# Patient Record
Sex: FEMALE | Race: WHITE | NOT HISPANIC OR LATINO | Employment: OTHER | URBAN - METROPOLITAN AREA
[De-identification: names, ages, dates, MRNs, and addresses within clinical notes are randomized per-mention and may not be internally consistent; named-entity substitution may affect disease eponyms.]

---

## 2017-09-12 ENCOUNTER — TELEPHONE (OUTPATIENT)
Dept: OBSTETRICS AND GYNECOLOGY | Facility: CLINIC | Age: 62
End: 2017-09-12

## 2017-09-12 DIAGNOSIS — Z12.31 ENCOUNTER FOR SCREENING MAMMOGRAM FOR BREAST CANCER: Primary | ICD-10-CM

## 2017-09-12 NOTE — TELEPHONE ENCOUNTER
----- Message from Dawna Leal sent at 9/12/2017 12:33 PM CDT -----  Contact: self  Tianna Bryan  MRN: [id number]  Home Phone      [phone]  Work Phone      Not on file.  Mobile          Not on file.    Patient Care Team:  KARTHIKEYAN Grover III, MD as PCP - General (Family Medicine)  Carlos Levi MD as Obstetrician (Obstetrics)  OB? No  What phone number can you be reached at? [phone]  Message: pt needs mammo order for DOS 09-

## 2017-09-21 ENCOUNTER — OFFICE VISIT (OUTPATIENT)
Dept: OBSTETRICS AND GYNECOLOGY | Facility: CLINIC | Age: 62
End: 2017-09-21
Payer: COMMERCIAL

## 2017-09-21 ENCOUNTER — HOSPITAL ENCOUNTER (OUTPATIENT)
Dept: RADIOLOGY | Facility: HOSPITAL | Age: 62
Discharge: HOME | End: 2017-09-21
Attending: OBSTETRICS & GYNECOLOGY
Payer: COMMERCIAL

## 2017-09-21 VITALS
BODY MASS INDEX: 26.84 KG/M2 | HEIGHT: 66 IN | WEIGHT: 167 LBS | SYSTOLIC BLOOD PRESSURE: 120 MMHG | HEART RATE: 86 BPM | DIASTOLIC BLOOD PRESSURE: 82 MMHG | RESPIRATION RATE: 16 BRPM

## 2017-09-21 VITALS — WEIGHT: 175 LBS | BODY MASS INDEX: 28.12 KG/M2 | HEIGHT: 66 IN

## 2017-09-21 DIAGNOSIS — N95.1 MENOPAUSAL SYMPTOMS: ICD-10-CM

## 2017-09-21 DIAGNOSIS — Z12.31 ENCOUNTER FOR SCREENING MAMMOGRAM FOR BREAST CANCER: ICD-10-CM

## 2017-09-21 DIAGNOSIS — Z01.419 WELL WOMAN EXAM WITH ROUTINE GYNECOLOGICAL EXAM: Primary | ICD-10-CM

## 2017-09-21 PROCEDURE — 77067 SCR MAMMO BI INCL CAD: CPT | Mod: TC

## 2017-09-21 PROCEDURE — 99396 PREV VISIT EST AGE 40-64: CPT | Mod: S$GLB,,, | Performed by: OBSTETRICS & GYNECOLOGY

## 2017-09-21 PROCEDURE — 77067 SCR MAMMO BI INCL CAD: CPT | Mod: 26,,, | Performed by: RADIOLOGY

## 2017-09-21 PROCEDURE — 99999 PR PBB SHADOW E&M-EST. PATIENT-LVL III: CPT | Mod: PBBFAC,,, | Performed by: OBSTETRICS & GYNECOLOGY

## 2017-09-21 PROCEDURE — 77063 BREAST TOMOSYNTHESIS BI: CPT | Mod: 26,,, | Performed by: RADIOLOGY

## 2017-09-21 NOTE — PROGRESS NOTES
Subjective:    Patient ID: Tianna Bryan is a 61 y.o. female.     Chief Complaint: Annual Well Woman Exam     History of Present Illness:  Tianna presents today for Annual Well Woman exam. .Patient's last menstrual period was 1989.. She is currently using no hormone replacement therapy for the past 9 months and she reports problems -   with hot flashes, night sweats, irritability and vaginal dryness. She denies breast tenderness, masses, nipple discharge.  She reports no problems with urination. Bowel movements have not significantly changed. She would prefer not to get back on Estrogen.    Menstrual History:   Patient's last menstrual period was 1989.    OB History      Para Term  AB Living    2 2 2     2    SAB TAB Ectopic Multiple Live Births            2          Review of Systems   Constitutional: Negative for activity change, appetite change, chills, diaphoresis, fatigue, fever and unexpected weight change.   HENT: Negative for mouth sores and tinnitus.    Eyes: Negative for discharge and visual disturbance.   Respiratory: Negative for cough, shortness of breath and wheezing.    Cardiovascular: Negative for chest pain, palpitations and leg swelling.   Gastrointestinal: Positive for abdominal pain and bloating. Negative for blood in stool, constipation, diarrhea, nausea and vomiting.   Endocrine: Positive for hot flashes. Negative for diabetes, hair loss, hyperthyroidism and hypothyroidism.   Genitourinary: Negative for decreased libido, dyspareunia, dysuria, flank pain, frequency, genital sores, hematuria, menorrhagia, menstrual problem, pelvic pain, urgency, vaginal bleeding, vaginal discharge, vaginal pain, urinary incontinence, postcoital bleeding and vaginal odor.   Musculoskeletal: Positive for back pain, joint swelling and myalgias.   Skin:  Negative for rash, no acne and hair changes.   Neurological: Negative for seizures, syncope, numbness and headaches.   Hematological:  Negative for adenopathy. Does not bruise/bleed easily.   Psychiatric/Behavioral: Positive for sleep disturbance. The patient is not nervous/anxious.    Breast: Negative for breast pain and nipple discharge        Objective:    Vital Signs:  Vitals:    09/21/17 1511   BP: 120/82   Pulse: 86   Resp: 16       Physical Exam:  General:  alert,normal appearing gravid female   Skin:  Skin color, texture, turgor normal. No rashes or lesions   HEENT:  conjunctivae/corneas clear. PERRL.   Neck: supple, trachea midline, no adenopathy or thyromegally   Respiratory:  clear to auscultation bilaterally   Heart:  regular rate and rhythm, S1, S2 normal, no murmur, click, rub or gallop   Breasts:   Nipples are protruding and have no nipple discharge. No palpable masses, erythema, skin changes, tenderness, or adenopathy.   Abdomen:  soft, non-tender. Bowel sounds normal. No masses,  no organomegaly   Pelvis: External genitalia: normal general appearance  Urinary system: urethral meatus normal, bladder nontender  Vaginal: normal mucosa without prolapse or lesions. Mild atrophy  Cervix: removed surgically  Uterus: removed surgically  Adnexa: removed surgically   Extremities: Normal ROM; no edema, no cyanosis   Neurologial: Normal strength and tone. No focal numbness or weakness. Reflexes 2+ and equal.   Psychiatric: normal mood, speech, dress, and thought processes         Assessment:      1. Well woman exam with routine gynecological exam    2. Menopausal symptoms          Plan:      Well woman exam with routine gynecological exam    Menopausal symptoms      She will try estroven for menopausal symptoms. If not better, I will consider gabapentin.    COUNSELING:  Tianna was counseled on A.C.O.G. Pap guidelines and recommendations for yearly pelvic exams in addition to recommendations for yearly mammograms and monthly self breast exams. In addition she was counseled on adequate intake of calcium and vitamin D; to see her PCP for other  health maintenance.

## 2017-09-21 NOTE — PATIENT INSTRUCTIONS
Estroven - one tablet nightly for one month     Gabapentin - can add if estroven not effective.    Calcium 1200mg + 1000 Vit D

## 2018-09-24 ENCOUNTER — TELEPHONE (OUTPATIENT)
Dept: OBSTETRICS AND GYNECOLOGY | Facility: CLINIC | Age: 63
End: 2018-09-24

## 2018-09-24 DIAGNOSIS — Z12.31 ENCOUNTER FOR SCREENING MAMMOGRAM FOR BREAST CANCER: Primary | ICD-10-CM

## 2018-09-24 NOTE — TELEPHONE ENCOUNTER
----- Message from Pretty Jean Baptiste MA sent at 9/24/2018  2:46 PM CDT -----  Contact: Self  Tianna Bryan  MRN: [id number]  Home Phone      [phone]  Work Phone      Not on file.  Mobile          Not on file.    Patient Care Team:  KARTHIKEYAN Grover III, MD as PCP - General (Family Medicine)  Carlos Levi MD as Obstetrician (Obstetrics)  OB? No  What phone number can you be reached at? [phone]  Message: Please place and link mammogram orders to appointment scheduled on 12/3/2018.

## 2018-10-26 ENCOUNTER — TELEPHONE (OUTPATIENT)
Dept: OBSTETRICS AND GYNECOLOGY | Facility: CLINIC | Age: 63
End: 2018-10-26

## 2018-10-26 NOTE — TELEPHONE ENCOUNTER
Patient states that she has been having vaginal burning and irritation x 2 weeks. She states that she tried OTC Monistat without relief. Patient denies discharge, odor, urinary or other symptoms. Appointment scheduled with patient.

## 2018-10-26 NOTE — TELEPHONE ENCOUNTER
----- Message from Svetlana Zavala sent at 10/26/2018  2:35 PM CDT -----  Contact: self  Tianna Bryan  MRN: [id number]  Home Phone  [phone]  Work Phone Not on file.  Mobile  Not on file.    Patient Care Team:  KARTHIKEYAN Grover III, MD as PCP - General (Family Medicine)  Carlos Levi MD as Obstetrician (Obstetrics)  OB? No  What phone number can you be reached at? [phone]  Message: Pt c/o burning to private area. Medication monistat with symptoms persisting for 3 weeks. Pt scheduled for her annual in December. Please return call.

## 2018-10-29 ENCOUNTER — OFFICE VISIT (OUTPATIENT)
Dept: OBSTETRICS AND GYNECOLOGY | Facility: CLINIC | Age: 63
End: 2018-10-29
Payer: COMMERCIAL

## 2018-10-29 ENCOUNTER — LAB VISIT (OUTPATIENT)
Dept: LAB | Facility: HOSPITAL | Age: 63
End: 2018-10-29
Attending: OBSTETRICS & GYNECOLOGY
Payer: COMMERCIAL

## 2018-10-29 VITALS
DIASTOLIC BLOOD PRESSURE: 84 MMHG | WEIGHT: 169.19 LBS | BODY MASS INDEX: 27.19 KG/M2 | RESPIRATION RATE: 18 BRPM | HEART RATE: 70 BPM | SYSTOLIC BLOOD PRESSURE: 120 MMHG | HEIGHT: 66 IN

## 2018-10-29 DIAGNOSIS — R53.83 FATIGUE, UNSPECIFIED TYPE: ICD-10-CM

## 2018-10-29 DIAGNOSIS — N94.9 VAGINAL BURNING: Primary | ICD-10-CM

## 2018-10-29 LAB — TSH SERPL DL<=0.005 MIU/L-ACNC: 1.68 UIU/ML

## 2018-10-29 PROCEDURE — 3008F BODY MASS INDEX DOCD: CPT | Mod: CPTII,S$GLB,, | Performed by: OBSTETRICS & GYNECOLOGY

## 2018-10-29 PROCEDURE — 36415 COLL VENOUS BLD VENIPUNCTURE: CPT

## 2018-10-29 PROCEDURE — 1101F PT FALLS ASSESS-DOCD LE1/YR: CPT | Mod: CPTII,S$GLB,, | Performed by: OBSTETRICS & GYNECOLOGY

## 2018-10-29 PROCEDURE — 99999 PR PBB SHADOW E&M-EST. PATIENT-LVL III: CPT | Mod: PBBFAC,,, | Performed by: OBSTETRICS & GYNECOLOGY

## 2018-10-29 PROCEDURE — 87660 TRICHOMONAS VAGIN DIR PROBE: CPT

## 2018-10-29 PROCEDURE — 99213 OFFICE O/P EST LOW 20 MIN: CPT | Mod: S$GLB,,, | Performed by: OBSTETRICS & GYNECOLOGY

## 2018-10-29 PROCEDURE — 84443 ASSAY THYROID STIM HORMONE: CPT

## 2018-10-29 RX ORDER — ALPRAZOLAM 1 MG/1
TABLET ORAL
COMMUNITY
Start: 2018-10-05 | End: 2023-09-25

## 2018-10-29 NOTE — PROGRESS NOTES
Subjective:    Patient ID: Tianna Bryan is a 63 y.o. y.o. female    Chief Complaint:   Chief Complaint   Patient presents with    Vaginal burning     x 3 weeks; denies discharge or irritation       History of Present Illness:  Tianna presents today for evaluation of vaginal burning that has shikha present for about one month. She tried Monistat and Diflucan but states she has not had any relief. She denies vaginal discharge. She also complains of extreme fatigue. She had a health screen at work and states her cholesterol was a little elevated, but everything else was normal. Thyroid was not evaluated.    Review of Systems   Constitutional: Positive for fatigue. Negative for activity change, appetite change, chills, diaphoresis, fever and unexpected weight change.   HENT: Negative for mouth sores and tinnitus.    Eyes: Negative for discharge and visual disturbance.   Respiratory: Negative for cough, shortness of breath and wheezing.    Cardiovascular: Negative for chest pain, palpitations and leg swelling.   Gastrointestinal: Negative for abdominal pain, blood in stool, constipation, diarrhea, nausea and vomiting.   Endocrine: Negative for diabetes, hair loss, hot flashes, hyperthyroidism and hypothyroidism.   Genitourinary: Positive for vaginal pain. Negative for decreased libido, dyspareunia, dysuria, flank pain, frequency, genital sores, hematuria, menorrhagia, menstrual problem, pelvic pain, urgency, vaginal bleeding, vaginal discharge, urinary incontinence, postcoital bleeding and vaginal odor.   Musculoskeletal: Negative for back pain, joint swelling and myalgias.   Skin:  Negative for rash, no acne and hair changes.   Neurological: Negative for seizures, syncope, numbness and headaches.   Hematological: Negative for adenopathy. Does not bruise/bleed easily.   Psychiatric/Behavioral: Negative for sleep disturbance. The patient is not nervous/anxious.    Breast: Negative for breast pain and nipple  discharge          Objective:    Vital Signs:  Vitals:    10/29/18 1442   BP: 120/84   Pulse: 70   Resp: 18       Physical Exam:  General:  alert,normal appearing gravid female   Skin:  Skin color, texture, turgor normal. No rashes or lesions   Abdomen: soft, non-tender. Bowel sounds normal. No masses,  no organomegaly   Pelvis: External genitalia: normal general appearance  Urinary system: urethral meatus normal, bladder nontender  Vaginal: atrophic mucosa, No discharge.  Cervix: removed surgically  Uterus: removed surgically  Adnexa: non palpable         Assessment:      1. Vaginal burning    2. Fatigue, unspecified type          Plan:      Vaginal burning  -     Vaginosis Screen by DNA Probe    Fatigue, unspecified type  -     TSH; Future; Expected date: 10/29/2018

## 2018-10-30 LAB
CANDIDA RRNA VAG QL PROBE: NEGATIVE
G VAGINALIS RRNA GENITAL QL PROBE: NEGATIVE
T VAGINALIS RRNA GENITAL QL PROBE: NEGATIVE

## 2018-11-01 ENCOUNTER — TELEPHONE (OUTPATIENT)
Dept: OBSTETRICS AND GYNECOLOGY | Facility: CLINIC | Age: 63
End: 2018-11-01

## 2018-11-01 RX ORDER — ESTRADIOL 10 UG/1
1 INSERT VAGINAL
Qty: 8 TABLET | Refills: 11 | Status: SHIPPED | OUTPATIENT
Start: 2018-11-01 | End: 2019-02-11 | Stop reason: SDUPTHER

## 2018-11-01 NOTE — TELEPHONE ENCOUNTER
----- Message from Kianna Lunsford MA sent at 11/1/2018 11:07 AM CDT -----  Contact: SELF  Tianna Bryan  MRN: [id number]  Home Phone      [phone]  Work Phone      Not on file.  Mobile          Not on file.    Patient Care Team:  KARTHIKEYAN Grover III, MD as PCP - General (Family Medicine)  Carlos Levi MD as Obstetrician (Obstetrics)  OB? No  What phone number can you be reached at?  [phone]  Message:   Would like culture results and following up on cream that was to be called in.

## 2018-11-01 NOTE — TELEPHONE ENCOUNTER
Patient requesting culture results, as well as, Cream that was to be sent in on Monday for vaginal irritation. Aware you were out of office until today at 2pm. Please advise.

## 2018-12-03 ENCOUNTER — HOSPITAL ENCOUNTER (OUTPATIENT)
Dept: RADIOLOGY | Facility: HOSPITAL | Age: 63
Discharge: HOME | End: 2018-12-03
Attending: OBSTETRICS & GYNECOLOGY
Payer: COMMERCIAL

## 2018-12-03 VITALS — BODY MASS INDEX: 27.16 KG/M2 | HEIGHT: 66 IN | WEIGHT: 169 LBS

## 2018-12-03 DIAGNOSIS — Z12.31 ENCOUNTER FOR SCREENING MAMMOGRAM FOR BREAST CANCER: ICD-10-CM

## 2018-12-03 PROCEDURE — 77067 SCR MAMMO BI INCL CAD: CPT | Mod: TC

## 2018-12-03 PROCEDURE — 77063 BREAST TOMOSYNTHESIS BI: CPT | Mod: 26,,, | Performed by: RADIOLOGY

## 2018-12-03 PROCEDURE — 77063 BREAST TOMOSYNTHESIS BI: CPT | Mod: TC

## 2018-12-03 PROCEDURE — 77067 SCR MAMMO BI INCL CAD: CPT | Mod: 26,,, | Performed by: RADIOLOGY

## 2018-12-10 ENCOUNTER — OFFICE VISIT (OUTPATIENT)
Dept: OBSTETRICS AND GYNECOLOGY | Facility: CLINIC | Age: 63
End: 2018-12-10
Payer: COMMERCIAL

## 2018-12-10 VITALS
DIASTOLIC BLOOD PRESSURE: 86 MMHG | HEART RATE: 88 BPM | BODY MASS INDEX: 26.03 KG/M2 | SYSTOLIC BLOOD PRESSURE: 138 MMHG | RESPIRATION RATE: 14 BRPM | WEIGHT: 162 LBS | HEIGHT: 66 IN

## 2018-12-10 DIAGNOSIS — N95.2 ATROPHIC VAGINITIS: ICD-10-CM

## 2018-12-10 DIAGNOSIS — Z01.419 WELL WOMAN EXAM WITH ROUTINE GYNECOLOGICAL EXAM: Primary | ICD-10-CM

## 2018-12-10 PROCEDURE — 99999 PR PBB SHADOW E&M-EST. PATIENT-LVL III: CPT | Mod: PBBFAC,,, | Performed by: OBSTETRICS & GYNECOLOGY

## 2018-12-10 PROCEDURE — 99397 PER PM REEVAL EST PAT 65+ YR: CPT | Mod: S$GLB,,, | Performed by: OBSTETRICS & GYNECOLOGY

## 2018-12-10 NOTE — PROGRESS NOTES
Subjective:    Patient ID: Tianna Bryan is a 63 y.o. female.     Chief Complaint: Annual Well Woman Exam     History of Present Illness:  Tianna presents today for Annual Well Woman exam. .Patient's last menstrual period was 1989.. She is currently using Vaginal Estrogen and she reports no problems with hot flashes, night sweats, irritability and vaginal dryness. She denies breast tenderness, masses, nipple discharge.  She reports no problems with urination. Bowel movements have not significantly changed.    Menstrual History:   Patient's last menstrual period was 1989.    OB History      Para Term  AB Living    2 2 2     2    SAB TAB Ectopic Multiple Live Births            2          Review of Systems   Constitutional: Positive for fatigue. Negative for activity change, appetite change, chills, diaphoresis, fever and unexpected weight change.   HENT: Negative for mouth sores and tinnitus.    Eyes: Negative for discharge and visual disturbance.   Respiratory: Negative for cough, shortness of breath and wheezing.    Cardiovascular: Negative for chest pain, palpitations and leg swelling.   Gastrointestinal: Positive for constipation. Negative for abdominal pain, blood in stool, diarrhea, nausea and vomiting.   Endocrine: Negative for diabetes, hair loss, hot flashes, hyperthyroidism and hypothyroidism.   Genitourinary: Negative for decreased libido, dyspareunia, dysuria, flank pain, frequency, genital sores, hematuria, menorrhagia, menstrual problem, pelvic pain, urgency, vaginal bleeding, vaginal discharge, vaginal pain, urinary incontinence, postcoital bleeding and vaginal odor.   Musculoskeletal: Positive for arthralgias and joint swelling. Negative for back pain and myalgias.   Neurological: Negative for seizures, syncope, numbness and headaches.   Hematological: Negative for adenopathy. Does not bruise/bleed easily.   Psychiatric/Behavioral: Negative for sleep disturbance. The  patient is not nervous/anxious.    Breast: Negative for mastodynia and nipple discharge        Objective:    Vital Signs:  Vitals:    12/10/18 1514   BP: 138/86   Pulse: 88   Resp: 14       Physical Exam:  General:  alert,normal appearing gravid female   Skin:  Skin color, texture, turgor normal. No rashes or lesions   HEENT:  conjunctivae/corneas clear. PERRL.   Neck: supple, trachea midline, no adenopathy or thyromegally   Respiratory:  clear to auscultation bilaterally   Heart:  regular rate and rhythm, S1, S2 normal, no murmur, click, rub or gallop   Breasts:   Nipples are protruding and have no nipple discharge. No palpable masses, erythema, skin changes, tenderness, or adenopathy.   Abdomen:  soft, non-tender. Bowel sounds normal. No masses,  no organomegaly   Pelvis: External genitalia: normal general appearance  Urinary system: urethral meatus normal, bladder nontender  Vaginal: atrophic mucosa  Cervix: removed surgically  Uterus: removed surgically  Adnexa: removed surgically   Extremities: Normal ROM; no edema, no cyanosis   Neurologial: Normal strength and tone. No focal numbness or weakness. Reflexes 2+ and equal.   Psychiatric: normal mood, speech, dress, and thought processes         Assessment:      1. Well woman exam with routine gynecological exam    2. Atrophic vaginitis          Plan:      Well woman exam with routine gynecological exam    Atrophic vaginitis        COUNSELING:  Tianna was counseled on A.C.O.G. Pap guidelines and recommendations for yearly pelvic exams in addition to recommendations for yearly mammograms and monthly self breast exams. In addition she was counseled on adequate intake of calcium and vitamin D; to see her PCP for other health maintenance.

## 2019-02-11 RX ORDER — ESTRADIOL 10 UG/1
1 INSERT VAGINAL
Qty: 8 TABLET | Refills: 11 | Status: SHIPPED | OUTPATIENT
Start: 2019-02-11 | End: 2019-06-18 | Stop reason: SDUPTHER

## 2019-02-11 NOTE — TELEPHONE ENCOUNTER
Tianna desire refill of Vagifem. Last annual 12/2018, states needs to be sent to [facility]. Pharmacy updated, medication pended. Please advise.   There is no problem list on file for this patient.    Prior to Admission medications    Medication Sig Start Date End Date Taking? Authorizing Provider   ACIPHEX 20 mg tablet  9/17/12   Historical Provider, MD   ALPRAZolam (XANAX) 1 MG tablet  10/5/18   Historical Provider, MD   duloxetine (CYMBALTA) 60 MG capsule TAKE 1 CAPSULE TWICE A DAY 10/3/16   Carlos Levi MD   estradiol (VAGIFEM) 10 mcg Tab Place 1 tablet (10 mcg total) vaginally twice a week. 11/1/18 11/1/19  Carlos Levi MD

## 2019-02-11 NOTE — TELEPHONE ENCOUNTER
----- Message from Kianna Lunsford MA sent at 2/11/2019  2:18 PM CST -----  Contact: self  Tianna Bryan  MRN: [id number]  Home Phone      [phone]  Work Phone      Not on file.  Mobile          [phone]    Patient Care Team:  Danie Butt MD as PCP - General (Family Medicine)  Carlos Levi MD as Obstetrician (Obstetrics)  OB? No  What phone number can you be reached at?  [phone]  Message:   Needs refill on Yuvafem.  Pharmacy:  [facility]

## 2019-06-18 RX ORDER — ESTRADIOL 10 UG/1
1 INSERT VAGINAL
Qty: 24 TABLET | Refills: 2 | Status: SHIPPED | OUTPATIENT
Start: 2019-06-20 | End: 2020-02-04 | Stop reason: SDUPTHER

## 2019-06-18 NOTE — TELEPHONE ENCOUNTER
Tianna desire refill of Vagifem. Last annual 12/2018. Please advise.   There is no problem list on file for this patient.    Prior to Admission medications    Medication Sig Start Date End Date Taking? Authorizing Provider   ACIPHEX 20 mg tablet  9/17/12   Historical Provider, MD   ALPRAZolam (XANAX) 1 MG tablet  10/5/18   Historical Provider, MD   duloxetine (CYMBALTA) 60 MG capsule TAKE 1 CAPSULE TWICE A DAY 10/3/16   Carlos Levi MD   estradiol (VAGIFEM) 10 mcg Tab Place 1 tablet (10 mcg total) vaginally twice a week. 2/11/19 2/11/20  Carlos Levi MD

## 2019-09-11 ENCOUNTER — TELEPHONE (OUTPATIENT)
Dept: OBSTETRICS AND GYNECOLOGY | Facility: CLINIC | Age: 64
End: 2019-09-11

## 2019-09-11 DIAGNOSIS — Z12.39 BREAST CANCER SCREENING: Primary | ICD-10-CM

## 2019-09-11 NOTE — TELEPHONE ENCOUNTER
----- Message from Ana Lilia Ferrer MA sent at 9/11/2019 10:57 AM CDT -----  Contact: self      ----- Message -----  From: Kianna Lunsford MA  Sent: 9/11/2019  10:20 AM  To: Amita Child    Tianna Bryan  MRN: [id number]  Home Phone      [phone]  Work Phone      Not on file.  Mobile          [phone]    Patient Care Team:  Danie Butt MD as PCP - General (Family Medicine)  Carlos Levi MD as Obstetrician (Obstetrics)  OB? No  What phone number can you be reached at?   [phone]  Message:   Please link mammo orders to appt 01/08/2019.  Thanks!

## 2020-01-22 ENCOUNTER — HOSPITAL ENCOUNTER (EMERGENCY)
Facility: HOSPITAL | Age: 65
Discharge: HOME | End: 2020-01-22
Attending: SURGERY
Payer: COMMERCIAL

## 2020-01-22 VITALS
RESPIRATION RATE: 16 BRPM | SYSTOLIC BLOOD PRESSURE: 135 MMHG | BODY MASS INDEX: 26.52 KG/M2 | HEART RATE: 70 BPM | HEIGHT: 66 IN | OXYGEN SATURATION: 99 % | WEIGHT: 165 LBS | TEMPERATURE: 98 F | DIASTOLIC BLOOD PRESSURE: 70 MMHG

## 2020-01-22 DIAGNOSIS — T14.90XA TRAUMA: ICD-10-CM
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
## 2020-01-22 DIAGNOSIS — S16.1XXA CERVICAL STRAIN, ACUTE, INITIAL ENCOUNTER: ICD-10-CM

## 2020-01-22 LAB
AMORPH CRY URNS QL MICRO: ABNORMAL
BACTERIA #/AREA URNS HPF: ABNORMAL /HPF
BILIRUB UR QL STRIP: NEGATIVE
CLARITY UR: ABNORMAL
COLOR UR: YELLOW
GLUCOSE UR QL STRIP: NEGATIVE
HGB UR QL STRIP: NEGATIVE
KETONES UR QL STRIP: NEGATIVE
LEUKOCYTE ESTERASE UR QL STRIP: ABNORMAL
MICROSCOPIC COMMENT: ABNORMAL
NITRITE UR QL STRIP: NEGATIVE
PH UR STRIP: 8 [PH] (ref 5–8)
PROT UR QL STRIP: NEGATIVE
RBC #/AREA URNS HPF: 5 /HPF (ref 0–4)
SP GR UR STRIP: 1.02 (ref 1–1.03)
SQUAMOUS #/AREA URNS HPF: >100 /HPF
URN SPEC COLLECT METH UR: ABNORMAL
UROBILINOGEN UR STRIP-ACNC: NEGATIVE EU/DL
WBC #/AREA URNS HPF: 6 /HPF (ref 0–5)

## 2020-01-22 PROCEDURE — 99284 EMERGENCY DEPT VISIT MOD MDM: CPT | Mod: 25

## 2020-01-22 PROCEDURE — 25000003 PHARM REV CODE 250: Performed by: SURGERY

## 2020-01-22 PROCEDURE — 81000 URINALYSIS NONAUTO W/SCOPE: CPT

## 2020-01-22 RX ORDER — NAPROXEN 250 MG/1
500 TABLET ORAL
Status: COMPLETED | OUTPATIENT
Start: 2020-01-22 | End: 2020-01-22

## 2020-01-22 RX ORDER — METHOCARBAMOL 500 MG/1
500 TABLET, FILM COATED ORAL
Status: COMPLETED | OUTPATIENT
Start: 2020-01-22 | End: 2020-01-22

## 2020-01-22 RX ORDER — SULFAMETHOXAZOLE AND TRIMETHOPRIM 800; 160 MG/1; MG/1
1 TABLET ORAL 2 TIMES DAILY
Qty: 14 TABLET | Refills: 0 | Status: SHIPPED | OUTPATIENT
Start: 2020-01-22 | End: 2020-01-29

## 2020-01-22 RX ORDER — METHOCARBAMOL 500 MG/1
1000 TABLET, FILM COATED ORAL 3 TIMES DAILY
Qty: 30 TABLET | Refills: 0 | Status: SHIPPED | OUTPATIENT
Start: 2020-01-22 | End: 2020-01-27

## 2020-01-22 RX ORDER — NAPROXEN 500 MG/1
500 TABLET ORAL 2 TIMES DAILY WITH MEALS
Qty: 12 TABLET | Refills: 0 | Status: SHIPPED | OUTPATIENT
Start: 2020-01-22 | End: 2020-01-28

## 2020-01-22 RX ORDER — CYCLOBENZAPRINE HCL 10 MG
10 TABLET ORAL
Status: DISCONTINUED | OUTPATIENT
Start: 2020-01-22 | End: 2020-01-22

## 2020-01-22 RX ADMIN — METHOCARBAMOL TABLETS 500 MG: 500 TABLET, COATED ORAL at 04:01

## 2020-01-22 RX ADMIN — NAPROXEN 500 MG: 250 TABLET ORAL at 04:01
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
## 2020-02-04 ENCOUNTER — HOSPITAL ENCOUNTER (OUTPATIENT)
Dept: RADIOLOGY | Facility: HOSPITAL | Age: 65
Discharge: HOME | End: 2020-02-04
Attending: OBSTETRICS & GYNECOLOGY
Payer: COMMERCIAL

## 2020-02-04 ENCOUNTER — OFFICE VISIT (OUTPATIENT)
Dept: OBSTETRICS AND GYNECOLOGY | Facility: CLINIC | Age: 65
End: 2020-02-04
Payer: COMMERCIAL

## 2020-02-04 VITALS
BODY MASS INDEX: 26.58 KG/M2 | HEIGHT: 66 IN | SYSTOLIC BLOOD PRESSURE: 114 MMHG | HEIGHT: 66 IN | HEART RATE: 70 BPM | RESPIRATION RATE: 16 BRPM | WEIGHT: 165.38 LBS | DIASTOLIC BLOOD PRESSURE: 72 MMHG | WEIGHT: 165 LBS | BODY MASS INDEX: 26.52 KG/M2

## 2020-02-04 DIAGNOSIS — N95.1 MENOPAUSAL STATE: ICD-10-CM

## 2020-02-04 DIAGNOSIS — Z01.419 WELL WOMAN EXAM WITH ROUTINE GYNECOLOGICAL EXAM: Primary | ICD-10-CM

## 2020-02-04 DIAGNOSIS — N95.2 ATROPHIC VAGINITIS: ICD-10-CM

## 2020-02-04 DIAGNOSIS — Z12.39 BREAST CANCER SCREENING: ICD-10-CM

## 2020-02-04 PROCEDURE — 99397 PR PREVENTIVE VISIT,EST,65 & OVER: ICD-10-PCS | Mod: S$GLB,,, | Performed by: OBSTETRICS & GYNECOLOGY

## 2020-02-04 PROCEDURE — 77067 MAMMO DIGITAL SCREENING BILAT WITH TOMOSYNTHESIS_CAD: ICD-10-PCS | Mod: 26,,, | Performed by: RADIOLOGY

## 2020-02-04 PROCEDURE — 77063 BREAST TOMOSYNTHESIS BI: CPT | Mod: 26,,, | Performed by: RADIOLOGY

## 2020-02-04 PROCEDURE — 77063 MAMMO DIGITAL SCREENING BILAT WITH TOMOSYNTHESIS_CAD: ICD-10-PCS | Mod: 26,,, | Performed by: RADIOLOGY

## 2020-02-04 PROCEDURE — 77067 SCR MAMMO BI INCL CAD: CPT | Mod: TC

## 2020-02-04 PROCEDURE — 99999 PR PBB SHADOW E&M-EST. PATIENT-LVL III: ICD-10-PCS | Mod: PBBFAC,,, | Performed by: OBSTETRICS & GYNECOLOGY

## 2020-02-04 PROCEDURE — 99999 PR PBB SHADOW E&M-EST. PATIENT-LVL III: CPT | Mod: PBBFAC,,, | Performed by: OBSTETRICS & GYNECOLOGY

## 2020-02-04 PROCEDURE — 99397 PER PM REEVAL EST PAT 65+ YR: CPT | Mod: S$GLB,,, | Performed by: OBSTETRICS & GYNECOLOGY

## 2020-02-04 PROCEDURE — 77067 SCR MAMMO BI INCL CAD: CPT | Mod: 26,,, | Performed by: RADIOLOGY

## 2020-02-04 RX ORDER — ESTRADIOL 10 UG/1
1 INSERT VAGINAL
Qty: 24 TABLET | Refills: 2 | Status: SHIPPED | OUTPATIENT
Start: 2020-02-06 | End: 2021-02-08

## 2020-02-04 NOTE — PROGRESS NOTES
Subjective:    Patient ID: Tianna Bryan is a 64 y.o. female.     Chief Complaint: Annual Well Woman Exam     History of Present Illness:  Tianna presents today for Annual Well Woman exam. .Patient's last menstrual period was 1989.. She is currently using Vagifem and she reports no problems with hot flashes, night sweats, irritability and vaginal dryness. She denies breast tenderness, denies masses, denies nipple discharge. She denies difficulty with urination. Bowel movements have not significantly changed.    Menstrual History:   Patient's last menstrual period was 1989.    OB History        2    Para   2    Term   2            AB        Living   2       SAB        TAB        Ectopic        Multiple        Live Births   2                 Review of Systems   Constitutional: Negative for activity change, appetite change, chills, diaphoresis, fatigue, fever and unexpected weight change.   HENT: Negative for mouth sores and tinnitus.    Eyes: Negative for discharge and visual disturbance.   Respiratory: Negative for cough, shortness of breath and wheezing.    Cardiovascular: Negative for chest pain, palpitations and leg swelling.   Gastrointestinal: Negative for abdominal pain, blood in stool, constipation, diarrhea, nausea and vomiting.   Endocrine: Negative for diabetes, hair loss, hot flashes, hyperthyroidism and hypothyroidism.   Genitourinary: Negative for decreased libido, dyspareunia, dysuria, flank pain, frequency, genital sores, hematuria, menorrhagia, menstrual problem, pelvic pain, urgency, vaginal bleeding, vaginal discharge, vaginal pain, urinary incontinence, postcoital bleeding and vaginal odor.   Musculoskeletal: Positive for arthralgias, back pain, joint swelling and myalgias.   Integumentary:  Negative for rash, acne, hair changes and nipple discharge.   Neurological: Negative for seizures, syncope, numbness and headaches.   Hematological: Negative for adenopathy. Does  not bruise/bleed easily.   Psychiatric/Behavioral: Negative for sleep disturbance. The patient is nervous/anxious.    Breast: Negative for mastodynia and nipple discharge        Objective:    Vital Signs:  Vitals:    02/04/20 1040   BP: 114/72   Pulse: 70   Resp: 16       Physical Exam:  General:  alert,normal appearing gravid female   Skin:  Skin color, texture, turgor normal. No rashes or lesions   HEENT:  conjunctivae/corneas clear. PERRL.   Neck: supple, trachea midline, no adenopathy or thyromegally   Respiratory:  clear to auscultation bilaterally   Heart:  regular rate and rhythm, S1, S2 normal, no murmur, click, rub or gallop   Breasts:  Nipples are protruding and have no nipple discharge. No palpable masses, erythema, skin changes, tenderness, or adenopathy.   Abdomen:  soft, non-tender. Bowel sounds normal. No masses,  no organomegaly   Pelvis: External genitalia: normal general appearance  Urinary system: urethral meatus normal, bladder nontender  Vaginal: normal mucosa without prolapse or lesions  Cervix: removed surgically  Uterus: removed surgically  Adnexa: removed surgically   Extremities: Normal ROM; no edema, no cyanosis   Neurologial: Normal strength and tone. No focal numbness or weakness. Reflexes 2+ and equal.   Psychiatric: normal mood, speech, dress, and thought processes         Assessment:      1. Well woman exam with routine gynecological exam    2. Atrophic vaginitis    3. Menopausal state          Plan:      Well woman exam with routine gynecological exam    Atrophic vaginitis  -     estradiol (VAGIFEM) 10 mcg Tab; Place 1 tablet (10 mcg total) vaginally twice a week.  Dispense: 24 tablet; Refill: 2    Menopausal state            Health Maintenance and Screening:   Tianna was counseled on A.C.O.G. Pap guidelines and recommendations for yearly pelvic exams in addition to recommendations for yearly mammograms and monthly self breast exams, and adequate calcium and vitamin D in her diet.    In addition, a lengthy discussion of needed Health Maintenance Screening was done with Tianna. She was counseled that she is overdue for Vaccinations for Pneumonia, Shingles and Tetanus. She was advised that she may obtain the needed vaccinations from her pharmacy or PCP..

## 2020-12-16 ENCOUNTER — TELEPHONE (OUTPATIENT)
Dept: OBSTETRICS AND GYNECOLOGY | Facility: CLINIC | Age: 65
End: 2020-12-16

## 2020-12-16 DIAGNOSIS — Z12.31 ENCOUNTER FOR SCREENING MAMMOGRAM FOR BREAST CANCER: Primary | ICD-10-CM

## 2020-12-16 NOTE — TELEPHONE ENCOUNTER
----- Message from Lilian Cho sent at 12/16/2020  1:32 PM CST -----  Contact: self  Tianna Bryan  MRN: [id number]  Home Phone      [phone]  Work Phone      Not on file.  Mobile          [phone]    Patient Care Team:  Danie Butt MD as PCP - General (Family Medicine)  Carlos Levi MD as Obstetrician (Obstetrics)  OB? No  What phone number can you be reached at? [phone]  Message:  Please link Mammo order to appt. Annual scheduled.

## 2021-01-06 NOTE — TELEPHONE ENCOUNTER
----- Message from Cindy Curry MA sent at 6/18/2019  2:09 PM CDT -----  Contact: Self      ----- Message -----  From: Lilian Adan  Sent: 6/18/2019   2:04 PM  To: Amita SPARKS Staff    Tianna Bryan  MRN: [id number]  Home Phone      [phone]  Work Phone      Not on file.  Mobile          [phone]    Patient Care Team:  Danie Butt MD as PCP - General (Family Medicine)  Carlos Levi MD as Obstetrician (Obstetrics)  OB? No  What phone number can you be reached at? [phone]  Message:   Pt states she would like a 90 day script for estridiol vaginal inserts called in to [facility]. Please advise.    Cephalexin Pregnancy And Lactation Text: This medication is Pregnancy Category B and considered safe during pregnancy.  It is also excreted in breast milk but can be used safely for shorter doses.

## 2021-02-08 ENCOUNTER — OFFICE VISIT (OUTPATIENT)
Dept: OBSTETRICS AND GYNECOLOGY | Facility: CLINIC | Age: 66
End: 2021-02-08
Payer: MEDICARE

## 2021-02-08 ENCOUNTER — HOSPITAL ENCOUNTER (OUTPATIENT)
Dept: RADIOLOGY | Facility: HOSPITAL | Age: 66
Discharge: HOME | End: 2021-02-08
Attending: OBSTETRICS & GYNECOLOGY
Payer: MEDICARE

## 2021-02-08 VITALS
WEIGHT: 169.63 LBS | HEART RATE: 87 BPM | BODY MASS INDEX: 27.26 KG/M2 | DIASTOLIC BLOOD PRESSURE: 80 MMHG | HEIGHT: 66 IN | RESPIRATION RATE: 13 BRPM | SYSTOLIC BLOOD PRESSURE: 126 MMHG

## 2021-02-08 VITALS — WEIGHT: 165 LBS | BODY MASS INDEX: 26.52 KG/M2 | HEIGHT: 66 IN

## 2021-02-08 DIAGNOSIS — Z01.419 ENCOUNTER FOR GYNECOLOGICAL EXAMINATION (GENERAL) (ROUTINE) WITHOUT ABNORMAL FINDINGS: Primary | ICD-10-CM

## 2021-02-08 DIAGNOSIS — Z12.31 ENCOUNTER FOR SCREENING MAMMOGRAM FOR BREAST CANCER: ICD-10-CM

## 2021-02-08 DIAGNOSIS — N95.2 ATROPHIC VAGINITIS: ICD-10-CM

## 2021-02-08 PROCEDURE — 99213 OFFICE O/P EST LOW 20 MIN: CPT | Mod: PBBFAC,25 | Performed by: OBSTETRICS & GYNECOLOGY

## 2021-02-08 PROCEDURE — G0101 CA SCREEN;PELVIC/BREAST EXAM: HCPCS | Mod: PBBFAC | Performed by: OBSTETRICS & GYNECOLOGY

## 2021-02-08 PROCEDURE — 77067 MAMMO DIGITAL SCREENING BILAT WITH TOMO: ICD-10-PCS | Mod: 26,,, | Performed by: RADIOLOGY

## 2021-02-08 PROCEDURE — 77063 BREAST TOMOSYNTHESIS BI: CPT | Mod: 26,,, | Performed by: RADIOLOGY

## 2021-02-08 PROCEDURE — 99999 PR STA SHADOW: CPT | Mod: PBBFAC,,, | Performed by: OBSTETRICS & GYNECOLOGY

## 2021-02-08 PROCEDURE — 77067 SCR MAMMO BI INCL CAD: CPT | Mod: 26,,, | Performed by: RADIOLOGY

## 2021-02-08 PROCEDURE — 99999 PR STA SHADOW: ICD-10-PCS | Mod: PBBFAC,,, | Performed by: OBSTETRICS & GYNECOLOGY

## 2021-02-08 PROCEDURE — 77063 MAMMO DIGITAL SCREENING BILAT WITH TOMO: ICD-10-PCS | Mod: 26,,, | Performed by: RADIOLOGY

## 2021-02-08 PROCEDURE — 77067 SCR MAMMO BI INCL CAD: CPT | Mod: TC

## 2021-02-08 PROCEDURE — 99999 PR PBB SHADOW E&M-EST. PATIENT-LVL III: CPT | Mod: PBBFAC,,, | Performed by: OBSTETRICS & GYNECOLOGY

## 2021-02-08 RX ORDER — CLONAZEPAM 1 MG/1
TABLET ORAL
COMMUNITY
Start: 2021-02-07

## 2021-02-08 RX ORDER — ESTRADIOL 0.1 MG/G
1 CREAM VAGINAL
Qty: 42.5 G | Refills: 1 | Status: SHIPPED | OUTPATIENT
Start: 2021-02-08 | End: 2022-02-08

## 2021-02-26 ENCOUNTER — IMMUNIZATION (OUTPATIENT)
Dept: FAMILY MEDICINE | Facility: CLINIC | Age: 66
End: 2021-02-26
Payer: MEDICARE

## 2021-02-26 DIAGNOSIS — Z23 NEED FOR VACCINATION: Primary | ICD-10-CM

## 2021-02-26 PROCEDURE — 91300 COVID-19, MRNA, LNP-S, PF, 30 MCG/0.3 ML DOSE VACCINE: CPT | Mod: PBBFAC | Performed by: FAMILY MEDICINE

## 2021-03-19 ENCOUNTER — IMMUNIZATION (OUTPATIENT)
Dept: FAMILY MEDICINE | Facility: CLINIC | Age: 66
End: 2021-03-19
Payer: MEDICARE

## 2021-03-19 DIAGNOSIS — Z23 NEED FOR VACCINATION: Primary | ICD-10-CM

## 2021-03-19 PROCEDURE — 0002A COVID-19, MRNA, LNP-S, PF, 30 MCG/0.3 ML DOSE VACCINE: CPT | Mod: PBBFAC | Performed by: FAMILY MEDICINE

## 2021-03-19 PROCEDURE — 91300 COVID-19, MRNA, LNP-S, PF, 30 MCG/0.3 ML DOSE VACCINE: CPT | Mod: PBBFAC | Performed by: FAMILY MEDICINE

## 2023-07-19 ENCOUNTER — TELEPHONE (OUTPATIENT)
Dept: OBSTETRICS AND GYNECOLOGY | Facility: CLINIC | Age: 68
End: 2023-07-19
Payer: MEDICARE

## 2023-07-19 DIAGNOSIS — Z12.31 BREAST CANCER SCREENING BY MAMMOGRAM: Primary | ICD-10-CM

## 2023-07-19 NOTE — TELEPHONE ENCOUNTER
----- Message from Amanda Davenport sent at 7/19/2023 11:44 AM CDT -----  Contact: self  Tianna Bryan  MRN: [id number]  Home Phone      [phone]  Work Phone      Not on file.  Mobile          [phone]    Patient Care Team:  Danie Butt MD as PCP - General (Family Medicine)  Carlos Levi MD as Obstetrician (Obstetrics)  OB? No  What phone number can you be reached at? [phone]  Message: mammo orders are needed for 08/07

## 2023-08-07 ENCOUNTER — HOSPITAL ENCOUNTER (OUTPATIENT)
Dept: RADIOLOGY | Facility: HOSPITAL | Age: 68
Discharge: HOME | End: 2023-08-07
Attending: OBSTETRICS & GYNECOLOGY
Payer: MEDICARE

## 2023-08-07 VITALS — WEIGHT: 169 LBS | HEIGHT: 66 IN | BODY MASS INDEX: 27.16 KG/M2

## 2023-08-07 DIAGNOSIS — Z12.31 BREAST CANCER SCREENING BY MAMMOGRAM: ICD-10-CM

## 2023-08-07 PROCEDURE — 77067 SCR MAMMO BI INCL CAD: CPT | Mod: TC

## 2023-08-07 PROCEDURE — 77067 MAMMO DIGITAL SCREENING BILAT WITH TOMO: ICD-10-PCS | Mod: 26,,, | Performed by: RADIOLOGY

## 2023-08-07 PROCEDURE — 77067 SCR MAMMO BI INCL CAD: CPT | Mod: 26,,, | Performed by: RADIOLOGY

## 2023-08-07 PROCEDURE — 77063 MAMMO DIGITAL SCREENING BILAT WITH TOMO: ICD-10-PCS | Mod: 26,,, | Performed by: RADIOLOGY

## 2023-08-07 PROCEDURE — 77063 BREAST TOMOSYNTHESIS BI: CPT | Mod: 26,,, | Performed by: RADIOLOGY

## 2023-09-25 ENCOUNTER — OFFICE VISIT (OUTPATIENT)
Dept: OBSTETRICS AND GYNECOLOGY | Facility: CLINIC | Age: 68
End: 2023-09-25
Payer: MEDICARE

## 2023-09-25 VITALS
HEIGHT: 66 IN | HEART RATE: 82 BPM | WEIGHT: 163.19 LBS | SYSTOLIC BLOOD PRESSURE: 130 MMHG | DIASTOLIC BLOOD PRESSURE: 82 MMHG | BODY MASS INDEX: 26.23 KG/M2

## 2023-09-25 DIAGNOSIS — Z90.79 HISTORY OF TOTAL HYSTERECTOMY WITH BILATERAL SALPINGO-OOPHORECTOMY (BSO): ICD-10-CM

## 2023-09-25 DIAGNOSIS — N95.2 POST-MENOPAUSAL ATROPHIC VAGINITIS: ICD-10-CM

## 2023-09-25 DIAGNOSIS — Z01.419 WELL WOMAN EXAM WITH ROUTINE GYNECOLOGICAL EXAM: Primary | ICD-10-CM

## 2023-09-25 DIAGNOSIS — Z90.710 HISTORY OF TOTAL HYSTERECTOMY WITH BILATERAL SALPINGO-OOPHORECTOMY (BSO): ICD-10-CM

## 2023-09-25 DIAGNOSIS — Z90.722 HISTORY OF TOTAL HYSTERECTOMY WITH BILATERAL SALPINGO-OOPHORECTOMY (BSO): ICD-10-CM

## 2023-09-25 PROCEDURE — 99999 PR PBB SHADOW E&M-EST. PATIENT-LVL III: CPT | Mod: PBBFAC,,, | Performed by: OBSTETRICS & GYNECOLOGY

## 2023-09-25 PROCEDURE — 99999 PR STA SHADOW: CPT | Mod: PBBFAC,,, | Performed by: OBSTETRICS & GYNECOLOGY

## 2023-09-25 PROCEDURE — G0101 CA SCREEN;PELVIC/BREAST EXAM: HCPCS | Mod: S$PBB | Performed by: OBSTETRICS & GYNECOLOGY

## 2023-09-25 PROCEDURE — 99213 OFFICE O/P EST LOW 20 MIN: CPT | Mod: PBBFAC,25 | Performed by: OBSTETRICS & GYNECOLOGY

## 2023-09-25 PROCEDURE — 99999 PR STA SHADOW: ICD-10-PCS | Mod: PBBFAC,,, | Performed by: OBSTETRICS & GYNECOLOGY

## 2023-09-25 RX ORDER — CLONAZEPAM 0.5 MG/1
0.5 TABLET ORAL
COMMUNITY
Start: 2023-06-07 | End: 2023-09-25

## 2023-09-25 RX ORDER — PREDNISOLONE ACETATE 10 MG/ML
1 SUSPENSION/ DROPS OPHTHALMIC 4 TIMES DAILY
COMMUNITY
Start: 2023-09-21

## 2023-09-25 RX ORDER — BUPROPION HYDROCHLORIDE 100 MG/1
TABLET, EXTENDED RELEASE ORAL
COMMUNITY
Start: 2022-10-28 | End: 2023-09-25

## 2023-09-25 RX ORDER — EVOLOCUMAB 140 MG/ML
INJECTION, SOLUTION SUBCUTANEOUS
COMMUNITY
Start: 2023-09-14

## 2023-09-25 RX ORDER — KETOROLAC TROMETHAMINE 5 MG/ML
SOLUTION OPHTHALMIC
COMMUNITY
Start: 2023-09-22

## 2023-09-25 NOTE — PROGRESS NOTES
Subjective:    Patient ID: Tianna Abdullahi is a 67 y.o. y.o. female.     Chief Complaint: Annual Well Woman Exam     History of Present Illness:  Tinana presents today for Annual Well Woman exam. She describes her menses as  absent, h/o hysterectomy .She denies pelvic pain.  She denies breast tenderness, masses, nipple discharge. She reports GYN complaints of vaginal dryness. She denies difficulty with urination or bowel movements. She admits to menopausal symptoms such as hotflashes, vaginal dryness, and night sweats. She denies bloating, early satiety, or weight changes. She is sexually active.     Menstrual History:   Patient's last menstrual period was 1989..     OB History    : 2  Para: 2  Term: 2  Livin  Live Births: 2            The following portions of the patient's history were reviewed and updated as appropriate: allergies, current medications, past family history, past medical history, past social history, past surgical history, and problem list.    ROS:   Review of Systems   Constitutional:  Negative for chills, diaphoresis, fatigue, fever and unexpected weight change.   HENT:  Negative for congestion, hearing loss, rhinorrhea and sore throat.    Eyes:  Negative for pain, discharge and visual disturbance.   Respiratory:  Negative for apnea, cough, shortness of breath and wheezing.    Cardiovascular:  Negative for chest pain, palpitations and leg swelling.   Gastrointestinal:  Negative for abdominal pain, constipation, diarrhea, nausea and vomiting.   Endocrine: Negative for cold intolerance and heat intolerance.   Genitourinary:  Positive for dysuria, frequency and vaginal pain. Negative for difficulty urinating, dyspareunia, flank pain, genital sores, hematuria, menstrual problem, pelvic pain, vaginal bleeding and vaginal discharge.   Musculoskeletal:  Negative for arthralgias, back pain and joint swelling.   Skin:  Negative for rash.   Neurological:  Negative for dizziness,  weakness, light-headedness, numbness and headaches.   Psychiatric/Behavioral:  Negative for agitation and confusion. The patient is not nervous/anxious.          Objective:    Vital Signs:  Vitals:    09/25/23 1052   BP: 130/82   Pulse: 82       Physical Exam:  General:  alert, cooperative, no distress   Skin:  Skin color, texture, turgor normal. No rashes or lesions   HEENT:  extra ocular movement intact, sclera clear, anicteric   Neck: supple, trachea midline, no adenopathy or thyromegally   Respiratory:  Normal effort   Breasts:  no discharge, erythema, tenderness, or palpable masses; no axillary lymphadenopathy   Abdomen:  soft, nontender, no palpable masses   Pelvis: External genitalia: normal general appearance  Urinary system: urethral meatus normal, bladder nontender  Vaginal: normal without tenderness, induration or masses, atrophic mucosa  Cervix: removed surgically  Uterus: removed surgically  Adnexa: removed surgically   Extremities: Normal ROM; no edema, no cyanosis   Neurologial: Normal strength and tone. No focal numbness or weakness.   Psychiatric: normal mood, speech, dress, and thought processes         Assessment:       Healthy female exam.     1. Well woman exam with routine gynecological exam    2. History of total hysterectomy with bilateral salpingo-oophorectomy (BSO)    3. Post-menopausal atrophic vaginitis          Plan:      Well woman exam with routine gynecological exam    History of total hysterectomy with bilateral salpingo-oophorectomy (BSO)    Post-menopausal atrophic vaginitis        [facility] 8/2023 negative    COUNSELING:  Tianna was counseled on A.C.O.G. Pap guidelines and recommendations for yearly pelvic exams in addition to recommendations for monthly self breast exams; to see her PCP for other health maintenance.

## 2024-10-07 ENCOUNTER — TELEPHONE (OUTPATIENT)
Dept: OBSTETRICS AND GYNECOLOGY | Facility: CLINIC | Age: 69
End: 2024-10-07
Payer: MEDICARE

## 2024-10-07 DIAGNOSIS — Z12.31 BREAST CANCER SCREENING BY MAMMOGRAM: Primary | ICD-10-CM

## 2024-10-07 NOTE — TELEPHONE ENCOUNTER
----- Message from Med Assistant Hutchison sent at 10/7/2024  2:52 PM CDT -----  Contact: self  Tianna Abdullahi  MRN: [id number]  Home Phone      [phone]  Work Phone      Not on file.  Mobile          [phone]    Patient Care Team:  Danie Butt MD as PCP - General (Family Medicine)  Carlos Levi MD as Obstetrician (Obstetrics)  Mell Lehman MD as Consulting Physician (Obstetrics and Gynecology)  OB? No  What phone number can you be reached at? [phone]  Message: Please link mammo orders to appt 10/8/24.

## 2024-10-09 ENCOUNTER — HOSPITAL ENCOUNTER (OUTPATIENT)
Dept: RADIOLOGY | Facility: HOSPITAL | Age: 69
Discharge: HOME | End: 2024-10-09
Attending: OBSTETRICS & GYNECOLOGY
Payer: MEDICARE

## 2024-10-09 VITALS — HEIGHT: 66 IN | WEIGHT: 163 LBS | BODY MASS INDEX: 26.2 KG/M2

## 2024-10-09 DIAGNOSIS — Z12.31 BREAST CANCER SCREENING BY MAMMOGRAM: ICD-10-CM

## 2024-10-09 PROCEDURE — 77063 BREAST TOMOSYNTHESIS BI: CPT | Mod: 26,,, | Performed by: RADIOLOGY

## 2024-10-09 PROCEDURE — 77067 SCR MAMMO BI INCL CAD: CPT | Mod: 26,,, | Performed by: RADIOLOGY

## 2024-10-09 PROCEDURE — 77067 SCR MAMMO BI INCL CAD: CPT | Mod: TC
